# Patient Record
Sex: FEMALE | Race: WHITE | Employment: FULL TIME | URBAN - METROPOLITAN AREA
[De-identification: names, ages, dates, MRNs, and addresses within clinical notes are randomized per-mention and may not be internally consistent; named-entity substitution may affect disease eponyms.]

---

## 2017-03-06 LAB — HEPATITIS C ANTIBODY INTERPRETATION: NORMAL

## 2017-03-07 LAB
ABO/RH: NORMAL
ANTIBODY SCREEN: NORMAL
ANTIBODY SCREEN: NORMAL
BASOPHILS ABSOLUTE: 0 K/UL (ref 0–0.2)
BASOPHILS RELATIVE PERCENT: 0.7 %
EOSINOPHILS ABSOLUTE: 0.1 K/UL (ref 0–0.6)
EOSINOPHILS RELATIVE PERCENT: 2 %
HCT VFR BLD CALC: 42.1 % (ref 36–48)
HEMOGLOBIN: 14.1 G/DL (ref 12–16)
HEPATITIS B SURFACE ANTIGEN INTERPRETATION: NORMAL
LYMPHOCYTES ABSOLUTE: 1.3 K/UL (ref 1–5.1)
LYMPHOCYTES RELATIVE PERCENT: 18.2 %
MCH RBC QN AUTO: 32.3 PG (ref 26–34)
MCHC RBC AUTO-ENTMCNC: 33.4 G/DL (ref 31–36)
MCV RBC AUTO: 96.8 FL (ref 80–100)
MONOCYTES ABSOLUTE: 0.7 K/UL (ref 0–1.3)
MONOCYTES RELATIVE PERCENT: 10.4 %
NEUTROPHILS ABSOLUTE: 4.8 K/UL (ref 1.7–7.7)
NEUTROPHILS RELATIVE PERCENT: 68.7 %
PDW BLD-RTO: 13.1 % (ref 12.4–15.4)
PLATELET # BLD: 265 K/UL (ref 135–450)
PLATELET SLIDE REVIEW: ADEQUATE
PMV BLD AUTO: 7.3 FL (ref 5–10.5)
RBC # BLD: 4.35 M/UL (ref 4–5.2)
RUBELLA ANTIBODY IGG: 68.3 IU/ML
WBC # BLD: 6.9 K/UL (ref 4–11)

## 2017-07-18 LAB
GLUCOSE CHALLENGE: 121 MG/DL
HCT VFR BLD CALC: 33.7 % (ref 36–48)
HEMOGLOBIN: 11.3 G/DL (ref 12–16)
MCH RBC QN AUTO: 32.6 PG (ref 26–34)
MCHC RBC AUTO-ENTMCNC: 33.5 G/DL (ref 31–36)
MCV RBC AUTO: 97.2 FL (ref 80–100)
PDW BLD-RTO: 13.1 % (ref 12.4–15.4)
PLATELET # BLD: 247 K/UL (ref 135–450)
PMV BLD AUTO: 7.1 FL (ref 5–10.5)
RBC # BLD: 3.46 M/UL (ref 4–5.2)
WBC # BLD: 10.5 K/UL (ref 4–11)

## 2017-08-18 ENCOUNTER — OFFICE VISIT (OUTPATIENT)
Dept: ORTHOPEDIC SURGERY | Age: 27
End: 2017-08-18

## 2017-08-18 VITALS
HEART RATE: 89 BPM | SYSTOLIC BLOOD PRESSURE: 118 MMHG | DIASTOLIC BLOOD PRESSURE: 70 MMHG | BODY MASS INDEX: 41.66 KG/M2 | HEIGHT: 64 IN | WEIGHT: 244 LBS

## 2017-08-18 DIAGNOSIS — M51.36 DDD (DEGENERATIVE DISC DISEASE), LUMBAR: Primary | ICD-10-CM

## 2017-08-18 DIAGNOSIS — M62.830 BACK SPASM: ICD-10-CM

## 2017-08-18 PROCEDURE — 99203 OFFICE O/P NEW LOW 30 MIN: CPT | Performed by: ORTHOPAEDIC SURGERY

## 2017-08-18 RX ORDER — DEXTROAMPHETAMINE SULFATE 15 MG/1
CAPSULE, EXTENDED RELEASE ORAL
Status: ON HOLD | COMMUNITY
Start: 2017-08-05 | End: 2017-09-21

## 2017-08-18 RX ORDER — DEXTROAMPHETAMINE SULFATE 10 MG/1
TABLET ORAL
Status: ON HOLD | COMMUNITY
Start: 2017-08-05 | End: 2017-09-21

## 2017-08-18 RX ORDER — CYCLOBENZAPRINE HCL 5 MG
TABLET ORAL
Status: ON HOLD | COMMUNITY
Start: 2017-08-18 | End: 2017-09-21

## 2017-08-18 ASSESSMENT — ENCOUNTER SYMPTOMS: BACK PAIN: 1

## 2017-10-03 ENCOUNTER — HOSPITAL ENCOUNTER (OUTPATIENT)
Dept: PREADMISSION TESTING | Age: 27
Discharge: HOME | End: 2017-09-28

## 2017-10-18 ENCOUNTER — HOSPITAL ENCOUNTER (OUTPATIENT)
Dept: OTHER | Age: 27
Discharge: HOME | End: 2017-10-18
Attending: OBSTETRICS & GYNECOLOGY | Admitting: OBSTETRICS & GYNECOLOGY

## 2017-10-18 DIAGNOSIS — O13.9 PIH (PREGNANCY INDUCED HYPERTENSION), ANTEPARTUM: ICD-10-CM

## 2017-10-18 LAB
24HR URINE VOLUME (ML): 2700 ML
CREATININE 24 HOUR URINE: 1.5 G/24HR (ref 0.6–1.5)
PROTEIN 24 HOUR URINE: 0.35 G/24HR

## 2018-04-20 ENCOUNTER — OFFICE VISIT (OUTPATIENT)
Dept: INTERNAL MEDICINE CLINIC | Age: 28
End: 2018-04-20

## 2018-04-20 VITALS
HEIGHT: 64 IN | SYSTOLIC BLOOD PRESSURE: 120 MMHG | OXYGEN SATURATION: 98 % | WEIGHT: 255.2 LBS | DIASTOLIC BLOOD PRESSURE: 72 MMHG | RESPIRATION RATE: 16 BRPM | HEART RATE: 78 BPM | BODY MASS INDEX: 43.57 KG/M2 | TEMPERATURE: 98.6 F

## 2018-04-20 DIAGNOSIS — L84 CALLUS OF FOOT: ICD-10-CM

## 2018-04-20 DIAGNOSIS — Z00.00 PHYSICAL EXAM: ICD-10-CM

## 2018-04-20 DIAGNOSIS — E66.01 MORBID OBESITY (HCC): Primary | ICD-10-CM

## 2018-04-20 LAB
ANION GAP SERPL CALCULATED.3IONS-SCNC: 15 MMOL/L (ref 3–16)
BUN BLDV-MCNC: 12 MG/DL (ref 7–20)
CALCIUM SERPL-MCNC: 9.1 MG/DL (ref 8.3–10.6)
CHLORIDE BLD-SCNC: 103 MMOL/L (ref 99–110)
CHOLESTEROL, TOTAL: 176 MG/DL (ref 0–199)
CO2: 24 MMOL/L (ref 21–32)
CREAT SERPL-MCNC: 0.6 MG/DL (ref 0.6–1.1)
GFR AFRICAN AMERICAN: >60
GFR NON-AFRICAN AMERICAN: >60
GLUCOSE BLD-MCNC: 79 MG/DL (ref 70–99)
HCT VFR BLD CALC: 42 % (ref 36–48)
HDLC SERPL-MCNC: 45 MG/DL (ref 40–60)
HEMOGLOBIN: 14.2 G/DL (ref 12–16)
LDL CHOLESTEROL CALCULATED: 114 MG/DL
MCH RBC QN AUTO: 30.8 PG (ref 26–34)
MCHC RBC AUTO-ENTMCNC: 33.9 G/DL (ref 31–36)
MCV RBC AUTO: 90.8 FL (ref 80–100)
PDW BLD-RTO: 14.2 % (ref 12.4–15.4)
PLATELET # BLD: 301 K/UL (ref 135–450)
PMV BLD AUTO: 6.9 FL (ref 5–10.5)
POTASSIUM SERPL-SCNC: 4.5 MMOL/L (ref 3.5–5.1)
RBC # BLD: 4.63 M/UL (ref 4–5.2)
SODIUM BLD-SCNC: 142 MMOL/L (ref 136–145)
TRIGL SERPL-MCNC: 84 MG/DL (ref 0–150)
TSH REFLEX FT4: 0.8 UIU/ML (ref 0.27–4.2)
VLDLC SERPL CALC-MCNC: 17 MG/DL
WBC # BLD: 7 K/UL (ref 4–11)

## 2018-04-20 PROCEDURE — 99204 OFFICE O/P NEW MOD 45 MIN: CPT | Performed by: NURSE PRACTITIONER

## 2018-04-20 PROCEDURE — 36415 COLL VENOUS BLD VENIPUNCTURE: CPT | Performed by: NURSE PRACTITIONER

## 2018-04-20 ASSESSMENT — ENCOUNTER SYMPTOMS
DIARRHEA: 0
WHEEZING: 0
EYE PAIN: 0
SHORTNESS OF BREATH: 0
TROUBLE SWALLOWING: 0
CONSTIPATION: 0
COUGH: 0
ABDOMINAL PAIN: 0
BACK PAIN: 0
PHOTOPHOBIA: 0
NAUSEA: 0
VOMITING: 0
CHEST TIGHTNESS: 0
SORE THROAT: 0

## 2018-04-20 ASSESSMENT — PATIENT HEALTH QUESTIONNAIRE - PHQ9
1. LITTLE INTEREST OR PLEASURE IN DOING THINGS: 0
SUM OF ALL RESPONSES TO PHQ QUESTIONS 1-9: 0
2. FEELING DOWN, DEPRESSED OR HOPELESS: 0
SUM OF ALL RESPONSES TO PHQ9 QUESTIONS 1 & 2: 0

## 2018-04-21 LAB
ESTIMATED AVERAGE GLUCOSE: 99.7 MG/DL
HBA1C MFR BLD: 5.1 %

## 2018-04-23 LAB
HIV AG/AB: NORMAL
HIV ANTIGEN: NORMAL
HIV-1 ANTIBODY: NORMAL
HIV-2 AB: NORMAL

## 2018-11-11 LAB
CANDIDA SPECIES, DNA PROBE: ABNORMAL
GARDNERELLA VAGINALIS, DNA PROBE: ABNORMAL
TRICHOMONAS VAGINALIS DNA: ABNORMAL

## 2019-04-11 ENCOUNTER — TELEPHONE (OUTPATIENT)
Dept: INTERNAL MEDICINE CLINIC | Age: 29
End: 2019-04-11

## 2019-04-11 ENCOUNTER — OFFICE VISIT (OUTPATIENT)
Dept: INTERNAL MEDICINE CLINIC | Age: 29
End: 2019-04-11
Payer: COMMERCIAL

## 2019-04-11 VITALS
OXYGEN SATURATION: 98 % | HEART RATE: 72 BPM | HEIGHT: 64 IN | RESPIRATION RATE: 12 BRPM | BODY MASS INDEX: 41.66 KG/M2 | WEIGHT: 244 LBS | TEMPERATURE: 98.4 F

## 2019-04-11 DIAGNOSIS — R19.7 DIARRHEA, UNSPECIFIED TYPE: Primary | ICD-10-CM

## 2019-04-11 PROCEDURE — G8427 DOCREV CUR MEDS BY ELIG CLIN: HCPCS | Performed by: NURSE PRACTITIONER

## 2019-04-11 PROCEDURE — 99213 OFFICE O/P EST LOW 20 MIN: CPT | Performed by: NURSE PRACTITIONER

## 2019-04-11 PROCEDURE — 1036F TOBACCO NON-USER: CPT | Performed by: NURSE PRACTITIONER

## 2019-04-11 PROCEDURE — G8417 CALC BMI ABV UP PARAM F/U: HCPCS | Performed by: NURSE PRACTITIONER

## 2019-04-11 RX ORDER — DEXTROAMPHETAMINE SULFATE 15 MG/1
1 CAPSULE, EXTENDED RELEASE ORAL
Refills: 0 | COMMUNITY
Start: 2019-03-15 | End: 2021-12-31

## 2019-04-11 RX ORDER — DEXTROAMPHETAMINE SULFATE 10 MG/1
1 TABLET ORAL DAILY
Refills: 0 | Status: ON HOLD | COMMUNITY
Start: 2019-04-05 | End: 2021-12-31

## 2019-04-11 RX ORDER — GREEN TEA/HOODIA GORDONII 315-12.5MG
1 CAPSULE ORAL DAILY
Qty: 30 TABLET | Refills: 0 | Status: SHIPPED | OUTPATIENT
Start: 2019-04-11 | End: 2019-05-11

## 2019-04-11 ASSESSMENT — ENCOUNTER SYMPTOMS
FLATUS: 0
ABDOMINAL PAIN: 0
CHEST TIGHTNESS: 0
ANAL BLEEDING: 0
COUGH: 0
RECTAL PAIN: 0
NAUSEA: 0
VOMITING: 0
BLOATING: 1
ABDOMINAL DISTENTION: 0
CHOKING: 0
BLOOD IN STOOL: 0
DIARRHEA: 1
CONSTIPATION: 0

## 2019-04-11 ASSESSMENT — PATIENT HEALTH QUESTIONNAIRE - PHQ9: DEPRESSION UNABLE TO ASSESS: URGENT/EMERGENT SITUATION

## 2019-04-11 NOTE — PROGRESS NOTES
Attack Paternal Grandfather     Atrial Fibrillation Father     No Known Problems Sister     No Known Problems Brother     Rheum Arthritis Maternal Grandmother     No Known Problems Maternal Grandfather     Diabetes Paternal Grandmother        Social History     Socioeconomic History    Marital status: Single     Spouse name: Not on file    Number of children: Not on file    Years of education: Not on file    Highest education level: Not on file   Occupational History    Occupation: stay at home mom    Social Needs    Financial resource strain: Not on file    Food insecurity:     Worry: Not on file     Inability: Not on file    Transportation needs:     Medical: Not on file     Non-medical: Not on file   Tobacco Use    Smoking status: Former Smoker     Packs/day: 0.50     Years: 7.00     Pack years: 3.50     Types: Cigarettes     Last attempt to quit: 3/10/2017     Years since quittin.0    Smokeless tobacco: Never Used   Substance and Sexual Activity    Alcohol use: No    Drug use: No    Sexual activity: Yes     Partners: Male     Birth control/protection: Condom   Lifestyle    Physical activity:     Days per week: Not on file     Minutes per session: Not on file    Stress: Not on file   Relationships    Social connections:     Talks on phone: Not on file     Gets together: Not on file     Attends Cheondoism service: Not on file     Active member of club or organization: Not on file     Attends meetings of clubs or organizations: Not on file     Relationship status: Not on file    Intimate partner violence:     Fear of current or ex partner: Not on file     Emotionally abused: Not on file     Physically abused: Not on file     Forced sexual activity: Not on file   Other Topics Concern    Not on file   Social History Narrative    Not on file       Review of Systems   Constitutional: Negative for activity change, appetite change, chills, fatigue, fever, unexpected weight change and weight ACIDOPHILUS) TABS; Take 1 tablet by mouth daily  -     C DIFF TOXIN/ANTIGEN; Future  -     GI Bacterial Pathogens By PCR; Future    Patient instructed to increase PO intake as tolerated   All questions addressed and answered. Patient agrees with plan of care. No follow-ups on file.

## 2019-04-11 NOTE — LETTER
[facility] [address]  Phone: [phone]  Fax: [phone]    DENNYS Fonseca CNP        April 11, 2019     Patient: Duke Landaverde   YOB: 1990   Date of Visit: 4/11/2019       To Whom It May Concern: It is my medical opinion that Duke Landaverde should remain out of work pending lab results. If you have any questions or concerns, please don't hesitate to call.     Sincerely,        DENNYS Fonseca CNP

## 2019-04-11 NOTE — TELEPHONE ENCOUNTER
Pt said her employer needs a note saying that she cannot come back to work until the results of stool sample are done. She want to pick this up today.

## 2019-04-11 NOTE — TELEPHONE ENCOUNTER
Please provide note, instructed that patient should be excused from work pending negative lab result testing.  Thank you

## 2019-04-12 ENCOUNTER — TELEPHONE (OUTPATIENT)
Dept: INTERNAL MEDICINE CLINIC | Age: 29
End: 2019-04-12

## 2019-04-12 NOTE — TELEPHONE ENCOUNTER
VERÓNICA--Pt called. She said he was in yesterday for diarrhea. She took containers home for stool samples but she has not had any BM's at all since yesterday before appt. She will call back on Monday with an update.

## 2019-08-14 ENCOUNTER — OFFICE VISIT (OUTPATIENT)
Dept: INTERNAL MEDICINE CLINIC | Age: 29
End: 2019-08-14
Payer: COMMERCIAL

## 2019-08-14 VITALS
OXYGEN SATURATION: 98 % | DIASTOLIC BLOOD PRESSURE: 60 MMHG | WEIGHT: 240 LBS | BODY MASS INDEX: 40.97 KG/M2 | TEMPERATURE: 97.8 F | HEART RATE: 70 BPM | SYSTOLIC BLOOD PRESSURE: 120 MMHG | HEIGHT: 64 IN

## 2019-08-14 DIAGNOSIS — F17.219 CIGARETTE NICOTINE DEPENDENCE WITH NICOTINE-INDUCED DISORDER: ICD-10-CM

## 2019-08-14 DIAGNOSIS — F41.9 ANXIETY: ICD-10-CM

## 2019-08-14 DIAGNOSIS — R61 HYPERHIDROSIS: Primary | ICD-10-CM

## 2019-08-14 LAB
ANION GAP SERPL CALCULATED.3IONS-SCNC: 11 MMOL/L (ref 3–16)
BUN BLDV-MCNC: 16 MG/DL (ref 7–20)
CALCIUM SERPL-MCNC: 9.5 MG/DL (ref 8.3–10.6)
CHLORIDE BLD-SCNC: 104 MMOL/L (ref 99–110)
CO2: 25 MMOL/L (ref 21–32)
CREAT SERPL-MCNC: 0.7 MG/DL (ref 0.6–1.1)
GFR AFRICAN AMERICAN: >60
GFR NON-AFRICAN AMERICAN: >60
GLUCOSE BLD-MCNC: 88 MG/DL (ref 70–99)
GONADOTROPIN, CHORIONIC (HCG) QUANT: <5 MIU/ML
HCT VFR BLD CALC: 41.5 % (ref 36–48)
HEMOGLOBIN: 14 G/DL (ref 12–16)
MCH RBC QN AUTO: 31.9 PG (ref 26–34)
MCHC RBC AUTO-ENTMCNC: 33.6 G/DL (ref 31–36)
MCV RBC AUTO: 94.8 FL (ref 80–100)
PDW BLD-RTO: 13.5 % (ref 12.4–15.4)
PLATELET # BLD: 287 K/UL (ref 135–450)
PMV BLD AUTO: 7.1 FL (ref 5–10.5)
POTASSIUM SERPL-SCNC: 4.4 MMOL/L (ref 3.5–5.1)
RBC # BLD: 4.38 M/UL (ref 4–5.2)
SEDIMENTATION RATE, ERYTHROCYTE: 16 MM/HR (ref 0–20)
SODIUM BLD-SCNC: 140 MMOL/L (ref 136–145)
TSH REFLEX FT4: 1.37 UIU/ML (ref 0.27–4.2)
WBC # BLD: 6.3 K/UL (ref 4–11)

## 2019-08-14 PROCEDURE — G8427 DOCREV CUR MEDS BY ELIG CLIN: HCPCS | Performed by: NURSE PRACTITIONER

## 2019-08-14 PROCEDURE — 1036F TOBACCO NON-USER: CPT | Performed by: NURSE PRACTITIONER

## 2019-08-14 PROCEDURE — 36415 COLL VENOUS BLD VENIPUNCTURE: CPT | Performed by: NURSE PRACTITIONER

## 2019-08-14 PROCEDURE — 99213 OFFICE O/P EST LOW 20 MIN: CPT | Performed by: NURSE PRACTITIONER

## 2019-08-14 PROCEDURE — G8417 CALC BMI ABV UP PARAM F/U: HCPCS | Performed by: NURSE PRACTITIONER

## 2019-08-14 PROCEDURE — 99406 BEHAV CHNG SMOKING 3-10 MIN: CPT | Performed by: NURSE PRACTITIONER

## 2019-08-14 ASSESSMENT — ENCOUNTER SYMPTOMS
COLOR CHANGE: 0
WHEEZING: 0
STRIDOR: 0
ABDOMINAL DISTENTION: 0
COUGH: 0
CHEST TIGHTNESS: 0
CHOKING: 0
SHORTNESS OF BREATH: 0

## 2019-08-14 ASSESSMENT — PATIENT HEALTH QUESTIONNAIRE - PHQ9
SUM OF ALL RESPONSES TO PHQ QUESTIONS 1-9: 2
1. LITTLE INTEREST OR PLEASURE IN DOING THINGS: 1
SUM OF ALL RESPONSES TO PHQ QUESTIONS 1-9: 2
2. FEELING DOWN, DEPRESSED OR HOPELESS: 1
DEPRESSION UNABLE TO ASSESS: URGENT/EMERGENT SITUATION
SUM OF ALL RESPONSES TO PHQ9 QUESTIONS 1 & 2: 2
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
## 2019-08-15 DIAGNOSIS — F41.9 ANXIETY: Primary | ICD-10-CM

## 2019-08-15 LAB
ANTI-NUCLEAR ANTIBODY (ANA): NEGATIVE
ESTIMATED AVERAGE GLUCOSE: 96.8 MG/DL
HBA1C MFR BLD: 5 %

## 2019-08-15 RX ORDER — SERTRALINE HYDROCHLORIDE 25 MG/1
25 TABLET, FILM COATED ORAL DAILY
Qty: 30 TABLET | Refills: 3 | Status: ON HOLD | OUTPATIENT
Start: 2019-08-15 | End: 2022-01-01

## 2019-12-12 LAB
CANDIDA SPECIES, DNA PROBE: NORMAL
GARDNERELLA VAGINALIS, DNA PROBE: NORMAL
TRICHOMONAS VAGINALIS DNA: NORMAL

## 2021-12-31 ENCOUNTER — APPOINTMENT (OUTPATIENT)
Dept: CT IMAGING | Age: 31
DRG: 137 | End: 2021-12-31
Payer: COMMERCIAL

## 2021-12-31 ENCOUNTER — HOSPITAL ENCOUNTER (INPATIENT)
Age: 31
LOS: 3 days | Discharge: HOME | DRG: 137 | End: 2022-01-03
Attending: EMERGENCY MEDICINE | Admitting: INTERNAL MEDICINE
Payer: COMMERCIAL

## 2021-12-31 DIAGNOSIS — J96.01 ACUTE RESPIRATORY FAILURE WITH HYPOXIA (HCC): Primary | ICD-10-CM

## 2021-12-31 DIAGNOSIS — R11.2 NON-INTRACTABLE VOMITING WITH NAUSEA, UNSPECIFIED VOMITING TYPE: ICD-10-CM

## 2021-12-31 DIAGNOSIS — U07.1 PNEUMONIA DUE TO COVID-19 VIRUS: ICD-10-CM

## 2021-12-31 DIAGNOSIS — R11.2 NAUSEA VOMITING AND DIARRHEA: ICD-10-CM

## 2021-12-31 DIAGNOSIS — J12.82 PNEUMONIA DUE TO COVID-19 VIRUS: ICD-10-CM

## 2021-12-31 DIAGNOSIS — E86.0 DEHYDRATION: ICD-10-CM

## 2021-12-31 DIAGNOSIS — E04.9 THYROID ENLARGEMENT: ICD-10-CM

## 2021-12-31 DIAGNOSIS — R19.7 NAUSEA VOMITING AND DIARRHEA: ICD-10-CM

## 2021-12-31 DIAGNOSIS — E87.6 HYPOKALEMIA: ICD-10-CM

## 2021-12-31 LAB
A/G RATIO: 1 (ref 1.1–2.2)
ALBUMIN SERPL-MCNC: 3.7 G/DL (ref 3.4–5)
ALP BLD-CCNC: 49 U/L (ref 40–129)
ALT SERPL-CCNC: 20 U/L (ref 10–40)
ANION GAP SERPL CALCULATED.3IONS-SCNC: 15 MMOL/L (ref 3–16)
AST SERPL-CCNC: 44 U/L (ref 15–37)
BACTERIA: ABNORMAL /HPF
BASOPHILS ABSOLUTE: 0 K/UL (ref 0–0.2)
BASOPHILS RELATIVE PERCENT: 0.2 %
BILIRUB SERPL-MCNC: 0.4 MG/DL (ref 0–1)
BILIRUBIN URINE: ABNORMAL
BLOOD, URINE: NEGATIVE
BUN BLDV-MCNC: 8 MG/DL (ref 7–20)
C DIFF TOXIN/ANTIGEN: NORMAL
C-REACTIVE PROTEIN: 174.1 MG/L (ref 0–5.1)
CALCIUM SERPL-MCNC: 8.7 MG/DL (ref 8.3–10.6)
CHLORIDE BLD-SCNC: 100 MMOL/L (ref 99–110)
CLARITY: ABNORMAL
CO2: 22 MMOL/L (ref 21–32)
COLOR: ABNORMAL
COMMENT UA: ABNORMAL
CREAT SERPL-MCNC: 0.7 MG/DL (ref 0.6–1.1)
D DIMER: 887 NG/ML DDU (ref 0–229)
EOSINOPHILS ABSOLUTE: 0 K/UL (ref 0–0.6)
EOSINOPHILS RELATIVE PERCENT: 0 %
EPITHELIAL CELLS, UA: 3 /HPF (ref 0–5)
GFR AFRICAN AMERICAN: >60
GFR NON-AFRICAN AMERICAN: >60
GLUCOSE BLD-MCNC: 95 MG/DL (ref 70–99)
GLUCOSE URINE: NEGATIVE MG/DL
HCG QUALITATIVE: NEGATIVE
HCT VFR BLD CALC: 40.3 % (ref 36–48)
HEMOGLOBIN: 13.8 G/DL (ref 12–16)
HYALINE CASTS: 19 /LPF (ref 0–8)
KETONES, URINE: >=80 MG/DL
LEUKOCYTE ESTERASE, URINE: NEGATIVE
LIPASE: 24 U/L (ref 13–60)
LYMPHOCYTES ABSOLUTE: 1 K/UL (ref 1–5.1)
LYMPHOCYTES RELATIVE PERCENT: 20.4 %
MAGNESIUM: 2.2 MG/DL (ref 1.8–2.4)
MCH RBC QN AUTO: 31.4 PG (ref 26–34)
MCHC RBC AUTO-ENTMCNC: 34.1 G/DL (ref 31–36)
MCV RBC AUTO: 92 FL (ref 80–100)
MICROSCOPIC EXAMINATION: YES
MONOCYTES ABSOLUTE: 0.4 K/UL (ref 0–1.3)
MONOCYTES RELATIVE PERCENT: 7.8 %
NEUTROPHILS ABSOLUTE: 3.7 K/UL (ref 1.7–7.7)
NEUTROPHILS RELATIVE PERCENT: 71.6 %
NITRITE, URINE: NEGATIVE
PDW BLD-RTO: 13.6 % (ref 12.4–15.4)
PH UA: 6 (ref 5–8)
PLATELET # BLD: 167 K/UL (ref 135–450)
PMV BLD AUTO: 6.8 FL (ref 5–10.5)
POTASSIUM SERPL-SCNC: 3.4 MMOL/L (ref 3.5–5.1)
PROTEIN UA: >=300 MG/DL
RBC # BLD: 4.38 M/UL (ref 4–5.2)
RBC UA: 4 /HPF (ref 0–4)
SODIUM BLD-SCNC: 137 MMOL/L (ref 136–145)
SPECIFIC GRAVITY UA: 1.03 (ref 1–1.03)
TOTAL PROTEIN: 7.3 G/DL (ref 6.4–8.2)
TROPONIN: <0.01 NG/ML
URINE REFLEX TO CULTURE: ABNORMAL
URINE TYPE: ABNORMAL
UROBILINOGEN, URINE: 0.2 E.U./DL
WBC # BLD: 5.1 K/UL (ref 4–11)
WBC UA: 7 /HPF (ref 0–5)

## 2021-12-31 PROCEDURE — 81001 URINALYSIS AUTO W/SCOPE: CPT

## 2021-12-31 PROCEDURE — 87324 CLOSTRIDIUM AG IA: CPT

## 2021-12-31 PROCEDURE — 85379 FIBRIN DEGRADATION QUANT: CPT

## 2021-12-31 PROCEDURE — 87449 NOS EACH ORGANISM AG IA: CPT

## 2021-12-31 PROCEDURE — 83690 ASSAY OF LIPASE: CPT

## 2021-12-31 PROCEDURE — 93005 ELECTROCARDIOGRAM TRACING: CPT | Performed by: PHYSICIAN ASSISTANT

## 2021-12-31 PROCEDURE — 96374 THER/PROPH/DIAG INJ IV PUSH: CPT

## 2021-12-31 PROCEDURE — 84484 ASSAY OF TROPONIN QUANT: CPT

## 2021-12-31 PROCEDURE — 6370000000 HC RX 637 (ALT 250 FOR IP): Performed by: PHYSICIAN ASSISTANT

## 2021-12-31 PROCEDURE — 94640 AIRWAY INHALATION TREATMENT: CPT

## 2021-12-31 PROCEDURE — 36415 COLL VENOUS BLD VENIPUNCTURE: CPT

## 2021-12-31 PROCEDURE — 85025 COMPLETE CBC W/AUTO DIFF WBC: CPT

## 2021-12-31 PROCEDURE — 6360000004 HC RX CONTRAST MEDICATION: Performed by: EMERGENCY MEDICINE

## 2021-12-31 PROCEDURE — 96375 TX/PRO/DX INJ NEW DRUG ADDON: CPT

## 2021-12-31 PROCEDURE — 87505 NFCT AGENT DETECTION GI: CPT

## 2021-12-31 PROCEDURE — 94761 N-INVAS EAR/PLS OXIMETRY MLT: CPT

## 2021-12-31 PROCEDURE — 6360000002 HC RX W HCPCS: Performed by: PHYSICIAN ASSISTANT

## 2021-12-31 PROCEDURE — 84703 CHORIONIC GONADOTROPIN ASSAY: CPT

## 2021-12-31 PROCEDURE — 83735 ASSAY OF MAGNESIUM: CPT

## 2021-12-31 PROCEDURE — 99284 EMERGENCY DEPT VISIT MOD MDM: CPT

## 2021-12-31 PROCEDURE — 2700000000 HC OXYGEN THERAPY PER DAY

## 2021-12-31 PROCEDURE — 87493 C DIFF AMPLIFIED PROBE: CPT

## 2021-12-31 PROCEDURE — 6370000000 HC RX 637 (ALT 250 FOR IP): Performed by: EMERGENCY MEDICINE

## 2021-12-31 PROCEDURE — 86140 C-REACTIVE PROTEIN: CPT

## 2021-12-31 PROCEDURE — 6360000002 HC RX W HCPCS: Performed by: INTERNAL MEDICINE

## 2021-12-31 PROCEDURE — 6370000000 HC RX 637 (ALT 250 FOR IP): Performed by: INTERNAL MEDICINE

## 2021-12-31 PROCEDURE — 71260 CT THORAX DX C+: CPT

## 2021-12-31 PROCEDURE — 1200000000 HC SEMI PRIVATE

## 2021-12-31 PROCEDURE — 80053 COMPREHEN METABOLIC PANEL: CPT

## 2021-12-31 PROCEDURE — 2580000003 HC RX 258: Performed by: PHYSICIAN ASSISTANT

## 2021-12-31 PROCEDURE — 2580000003 HC RX 258: Performed by: INTERNAL MEDICINE

## 2021-12-31 RX ORDER — DEXTROAMPHETAMINE SULFATE 10 MG/1
10 TABLET ORAL DAILY
Status: CANCELLED | OUTPATIENT
Start: 2021-12-31

## 2021-12-31 RX ORDER — ONDANSETRON 2 MG/ML
4 INJECTION INTRAMUSCULAR; INTRAVENOUS ONCE
Status: COMPLETED | OUTPATIENT
Start: 2021-12-31 | End: 2021-12-31

## 2021-12-31 RX ORDER — ONDANSETRON 4 MG/1
4 TABLET, ORALLY DISINTEGRATING ORAL EVERY 8 HOURS PRN
Status: DISCONTINUED | OUTPATIENT
Start: 2021-12-31 | End: 2022-01-03 | Stop reason: HOSPADM

## 2021-12-31 RX ORDER — KETOROLAC TROMETHAMINE 30 MG/ML
15 INJECTION, SOLUTION INTRAMUSCULAR; INTRAVENOUS ONCE
Status: COMPLETED | OUTPATIENT
Start: 2021-12-31 | End: 2021-12-31

## 2021-12-31 RX ORDER — IPRATROPIUM BROMIDE AND ALBUTEROL SULFATE 2.5; .5 MG/3ML; MG/3ML
1 SOLUTION RESPIRATORY (INHALATION) ONCE
Status: COMPLETED | OUTPATIENT
Start: 2021-12-31 | End: 2021-12-31

## 2021-12-31 RX ORDER — POLYETHYLENE GLYCOL 3350 17 G/17G
17 POWDER, FOR SOLUTION ORAL DAILY PRN
Status: DISCONTINUED | OUTPATIENT
Start: 2021-12-31 | End: 2022-01-03 | Stop reason: HOSPADM

## 2021-12-31 RX ORDER — DEXAMETHASONE SODIUM PHOSPHATE 10 MG/ML
6 INJECTION, SOLUTION INTRAMUSCULAR; INTRAVENOUS ONCE
Status: COMPLETED | OUTPATIENT
Start: 2021-12-31 | End: 2021-12-31

## 2021-12-31 RX ORDER — 0.9 % SODIUM CHLORIDE 0.9 %
1000 INTRAVENOUS SOLUTION INTRAVENOUS ONCE
Status: COMPLETED | OUTPATIENT
Start: 2021-12-31 | End: 2021-12-31

## 2021-12-31 RX ORDER — ACETAMINOPHEN 325 MG/1
650 TABLET ORAL EVERY 6 HOURS PRN
Status: DISCONTINUED | OUTPATIENT
Start: 2021-12-31 | End: 2022-01-03 | Stop reason: HOSPADM

## 2021-12-31 RX ORDER — ONDANSETRON 2 MG/ML
4 INJECTION INTRAMUSCULAR; INTRAVENOUS EVERY 6 HOURS PRN
Status: DISCONTINUED | OUTPATIENT
Start: 2021-12-31 | End: 2022-01-03 | Stop reason: HOSPADM

## 2021-12-31 RX ORDER — SODIUM CHLORIDE 0.9 % (FLUSH) 0.9 %
5-40 SYRINGE (ML) INJECTION EVERY 12 HOURS SCHEDULED
Status: DISCONTINUED | OUTPATIENT
Start: 2021-12-31 | End: 2022-01-03 | Stop reason: HOSPADM

## 2021-12-31 RX ORDER — GUAIFENESIN/DEXTROMETHORPHAN 100-10MG/5
5 SYRUP ORAL EVERY 4 HOURS PRN
Status: DISCONTINUED | OUTPATIENT
Start: 2021-12-31 | End: 2022-01-03 | Stop reason: HOSPADM

## 2021-12-31 RX ORDER — POTASSIUM CHLORIDE 20 MEQ/1
40 TABLET, EXTENDED RELEASE ORAL ONCE
Status: COMPLETED | OUTPATIENT
Start: 2021-12-31 | End: 2021-12-31

## 2021-12-31 RX ORDER — SODIUM CHLORIDE 0.9 % (FLUSH) 0.9 %
5-40 SYRINGE (ML) INJECTION PRN
Status: DISCONTINUED | OUTPATIENT
Start: 2021-12-31 | End: 2022-01-03 | Stop reason: HOSPADM

## 2021-12-31 RX ORDER — SERTRALINE HYDROCHLORIDE 25 MG/1
25 TABLET, FILM COATED ORAL DAILY
Status: DISCONTINUED | OUTPATIENT
Start: 2022-01-01 | End: 2022-01-01

## 2021-12-31 RX ORDER — ACETAMINOPHEN 650 MG/1
650 SUPPOSITORY RECTAL EVERY 6 HOURS PRN
Status: DISCONTINUED | OUTPATIENT
Start: 2021-12-31 | End: 2022-01-03 | Stop reason: HOSPADM

## 2021-12-31 RX ORDER — ZINC SULFATE 50(220)MG
50 CAPSULE ORAL DAILY
Status: DISCONTINUED | OUTPATIENT
Start: 2022-01-01 | End: 2022-01-03 | Stop reason: HOSPADM

## 2021-12-31 RX ORDER — VITAMIN B COMPLEX
2000 TABLET ORAL DAILY
Status: DISCONTINUED | OUTPATIENT
Start: 2022-01-01 | End: 2022-01-03 | Stop reason: HOSPADM

## 2021-12-31 RX ORDER — SODIUM CHLORIDE 9 MG/ML
25 INJECTION, SOLUTION INTRAVENOUS PRN
Status: DISCONTINUED | OUTPATIENT
Start: 2021-12-31 | End: 2022-01-03 | Stop reason: HOSPADM

## 2021-12-31 RX ORDER — DEXAMETHASONE SODIUM PHOSPHATE 10 MG/ML
6 INJECTION, SOLUTION INTRAMUSCULAR; INTRAVENOUS DAILY
Status: DISCONTINUED | OUTPATIENT
Start: 2022-01-01 | End: 2022-01-03 | Stop reason: HOSPADM

## 2021-12-31 RX ADMIN — DEXAMETHASONE SODIUM PHOSPHATE 6 MG: 10 INJECTION, SOLUTION INTRAMUSCULAR; INTRAVENOUS at 19:47

## 2021-12-31 RX ADMIN — ONDANSETRON 4 MG: 2 INJECTION INTRAMUSCULAR; INTRAVENOUS at 22:17

## 2021-12-31 RX ADMIN — POTASSIUM CHLORIDE 40 MEQ: 1500 TABLET, EXTENDED RELEASE ORAL at 18:48

## 2021-12-31 RX ADMIN — IPRATROPIUM BROMIDE AND ALBUTEROL SULFATE 1 AMPULE: .5; 3 SOLUTION RESPIRATORY (INHALATION) at 18:13

## 2021-12-31 RX ADMIN — SODIUM CHLORIDE 1000 ML: 9 INJECTION, SOLUTION INTRAVENOUS at 18:51

## 2021-12-31 RX ADMIN — IOPAMIDOL 75 ML: 755 INJECTION, SOLUTION INTRAVENOUS at 19:01

## 2021-12-31 RX ADMIN — KETOROLAC TROMETHAMINE 15 MG: 30 INJECTION, SOLUTION INTRAMUSCULAR at 18:47

## 2021-12-31 RX ADMIN — GUAIFENESIN AND DEXTROMETHORPHAN 5 ML: 100; 10 SYRUP ORAL at 22:17

## 2021-12-31 RX ADMIN — Medication 10 ML: at 22:18

## 2021-12-31 RX ADMIN — ENOXAPARIN SODIUM 30 MG: 30 INJECTION SUBCUTANEOUS at 22:17

## 2021-12-31 RX ADMIN — ONDANSETRON 4 MG: 2 INJECTION INTRAMUSCULAR; INTRAVENOUS at 18:47

## 2021-12-31 RX ADMIN — ACETAMINOPHEN 650 MG: 325 TABLET ORAL at 22:17

## 2021-12-31 ASSESSMENT — PAIN DESCRIPTION - DESCRIPTORS: DESCRIPTORS: ACHING

## 2021-12-31 ASSESSMENT — PAIN DESCRIPTION - PAIN TYPE: TYPE: ACUTE PAIN

## 2021-12-31 ASSESSMENT — PAIN DESCRIPTION - ORIENTATION: ORIENTATION: ANTERIOR

## 2021-12-31 ASSESSMENT — PAIN DESCRIPTION - FREQUENCY: FREQUENCY: INTERMITTENT

## 2021-12-31 ASSESSMENT — PAIN SCALES - GENERAL
PAINLEVEL_OUTOF10: 5
PAINLEVEL_OUTOF10: 3
PAINLEVEL_OUTOF10: 3

## 2021-12-31 ASSESSMENT — PAIN DESCRIPTION - LOCATION: LOCATION: CHEST

## 2021-12-31 ASSESSMENT — PAIN DESCRIPTION - ONSET: ONSET: ON-GOING

## 2021-12-31 NOTE — ED PROVIDER NOTES
EKG is reviewed myself. Dated today 56.   Rate 103 sinus tach otherwise normal.     Eliseo Haq MD  12/31/21 [id number]
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
## 2022-01-01 LAB
A/G RATIO: 0.9 (ref 1.1–2.2)
ALBUMIN SERPL-MCNC: 3.7 G/DL (ref 3.4–5)
ALP BLD-CCNC: 52 U/L (ref 40–129)
ALT SERPL-CCNC: 26 U/L (ref 10–40)
ANION GAP SERPL CALCULATED.3IONS-SCNC: 12 MMOL/L (ref 3–16)
AST SERPL-CCNC: 48 U/L (ref 15–37)
BASOPHILS ABSOLUTE: 0 K/UL (ref 0–0.2)
BASOPHILS RELATIVE PERCENT: 0.1 %
BILIRUB SERPL-MCNC: 0.4 MG/DL (ref 0–1)
BUN BLDV-MCNC: 10 MG/DL (ref 7–20)
C-REACTIVE PROTEIN: 174.9 MG/L (ref 0–5.1)
C. DIFFICILE TOXIN MOLECULAR: NORMAL
CALCIUM SERPL-MCNC: 8.7 MG/DL (ref 8.3–10.6)
CHLORIDE BLD-SCNC: 105 MMOL/L (ref 99–110)
CO2: 22 MMOL/L (ref 21–32)
CREAT SERPL-MCNC: 0.6 MG/DL (ref 0.6–1.1)
D DIMER: 925 NG/ML DDU (ref 0–229)
EKG ATRIAL RATE: 103 BPM
EKG DIAGNOSIS: NORMAL
EKG P AXIS: 34 DEGREES
EKG P-R INTERVAL: 118 MS
EKG Q-T INTERVAL: 334 MS
EKG QRS DURATION: 84 MS
EKG QTC CALCULATION (BAZETT): 437 MS
EKG R AXIS: 52 DEGREES
EKG T AXIS: 3 DEGREES
EKG VENTRICULAR RATE: 103 BPM
EOSINOPHILS ABSOLUTE: 0 K/UL (ref 0–0.6)
EOSINOPHILS RELATIVE PERCENT: 0 %
GFR AFRICAN AMERICAN: >60
GFR NON-AFRICAN AMERICAN: >60
GI BACTERIAL PATHOGENS BY PCR: NORMAL
GLUCOSE BLD-MCNC: 134 MG/DL (ref 70–99)
HCT VFR BLD CALC: 41.9 % (ref 36–48)
HEMOGLOBIN: 14 G/DL (ref 12–16)
LYMPHOCYTES ABSOLUTE: 0.5 K/UL (ref 1–5.1)
LYMPHOCYTES RELATIVE PERCENT: 12.4 %
MCH RBC QN AUTO: 30.9 PG (ref 26–34)
MCHC RBC AUTO-ENTMCNC: 33.4 G/DL (ref 31–36)
MCV RBC AUTO: 92.4 FL (ref 80–100)
MONOCYTES ABSOLUTE: 0.3 K/UL (ref 0–1.3)
MONOCYTES RELATIVE PERCENT: 6.9 %
NEUTROPHILS ABSOLUTE: 3.4 K/UL (ref 1.7–7.7)
NEUTROPHILS RELATIVE PERCENT: 80.6 %
PDW BLD-RTO: 13.4 % (ref 12.4–15.4)
PLATELET # BLD: 189 K/UL (ref 135–450)
PMV BLD AUTO: 7.2 FL (ref 5–10.5)
POTASSIUM REFLEX MAGNESIUM: 4.4 MMOL/L (ref 3.5–5.1)
RBC # BLD: 4.53 M/UL (ref 4–5.2)
SODIUM BLD-SCNC: 139 MMOL/L (ref 136–145)
TOTAL PROTEIN: 7.6 G/DL (ref 6.4–8.2)
WBC # BLD: 4.3 K/UL (ref 4–11)

## 2022-01-01 PROCEDURE — 85379 FIBRIN DEGRADATION QUANT: CPT

## 2022-01-01 PROCEDURE — 6360000002 HC RX W HCPCS: Performed by: INTERNAL MEDICINE

## 2022-01-01 PROCEDURE — 2580000003 HC RX 258: Performed by: INTERNAL MEDICINE

## 2022-01-01 PROCEDURE — 36415 COLL VENOUS BLD VENIPUNCTURE: CPT

## 2022-01-01 PROCEDURE — 6360000002 HC RX W HCPCS: Performed by: NURSE PRACTITIONER

## 2022-01-01 PROCEDURE — XW033H5 INTRODUCTION OF TOCILIZUMAB INTO PERIPHERAL VEIN, PERCUTANEOUS APPROACH, NEW TECHNOLOGY GROUP 5: ICD-10-PCS | Performed by: INTERNAL MEDICINE

## 2022-01-01 PROCEDURE — 2580000003 HC RX 258: Performed by: NURSE PRACTITIONER

## 2022-01-01 PROCEDURE — 6360000002 HC RX W HCPCS: Performed by: PHYSICIAN ASSISTANT

## 2022-01-01 PROCEDURE — 2700000000 HC OXYGEN THERAPY PER DAY

## 2022-01-01 PROCEDURE — 80053 COMPREHEN METABOLIC PANEL: CPT

## 2022-01-01 PROCEDURE — 86140 C-REACTIVE PROTEIN: CPT

## 2022-01-01 PROCEDURE — 85025 COMPLETE CBC W/AUTO DIFF WBC: CPT

## 2022-01-01 PROCEDURE — 6370000000 HC RX 637 (ALT 250 FOR IP): Performed by: INTERNAL MEDICINE

## 2022-01-01 PROCEDURE — 1200000000 HC SEMI PRIVATE

## 2022-01-01 PROCEDURE — 93010 ELECTROCARDIOGRAM REPORT: CPT | Performed by: INTERNAL MEDICINE

## 2022-01-01 RX ORDER — KETOROLAC TROMETHAMINE 30 MG/ML
30 INJECTION, SOLUTION INTRAMUSCULAR; INTRAVENOUS EVERY 6 HOURS PRN
Status: DISCONTINUED | OUTPATIENT
Start: 2022-01-01 | End: 2022-01-03 | Stop reason: HOSPADM

## 2022-01-01 RX ADMIN — Medication 10 ML: at 19:40

## 2022-01-01 RX ADMIN — ONDANSETRON 4 MG: 2 INJECTION INTRAMUSCULAR; INTRAVENOUS at 16:21

## 2022-01-01 RX ADMIN — ACETAMINOPHEN 650 MG: 325 TABLET ORAL at 18:57

## 2022-01-01 RX ADMIN — DEXAMETHASONE SODIUM PHOSPHATE 6 MG: 10 INJECTION, SOLUTION INTRAMUSCULAR; INTRAVENOUS at 08:47

## 2022-01-01 RX ADMIN — KETOROLAC TROMETHAMINE 30 MG: 30 INJECTION, SOLUTION INTRAMUSCULAR; INTRAVENOUS at 21:50

## 2022-01-01 RX ADMIN — ENOXAPARIN SODIUM 30 MG: 30 INJECTION SUBCUTANEOUS at 19:46

## 2022-01-01 RX ADMIN — Medication 50 MG: at 08:47

## 2022-01-01 RX ADMIN — ONDANSETRON 4 MG: 2 INJECTION INTRAMUSCULAR; INTRAVENOUS at 08:47

## 2022-01-01 RX ADMIN — ACETAMINOPHEN 650 MG: 325 TABLET ORAL at 09:01

## 2022-01-01 RX ADMIN — Medication 10 ML: at 08:48

## 2022-01-01 RX ADMIN — Medication 2000 UNITS: at 08:47

## 2022-01-01 RX ADMIN — GUAIFENESIN AND DEXTROMETHORPHAN 5 ML: 100; 10 SYRUP ORAL at 19:46

## 2022-01-01 RX ADMIN — ENOXAPARIN SODIUM 30 MG: 30 INJECTION SUBCUTANEOUS at 08:47

## 2022-01-01 RX ADMIN — TOCILIZUMAB 810 MG: 180 INJECTION, SOLUTION SUBCUTANEOUS at 11:30

## 2022-01-01 ASSESSMENT — PAIN DESCRIPTION - DESCRIPTORS: DESCRIPTORS: ACHING

## 2022-01-01 ASSESSMENT — PAIN SCALES - GENERAL
PAINLEVEL_OUTOF10: 0
PAINLEVEL_OUTOF10: 8
PAINLEVEL_OUTOF10: 7
PAINLEVEL_OUTOF10: 0

## 2022-01-01 ASSESSMENT — PAIN DESCRIPTION - PAIN TYPE: TYPE: ACUTE PAIN

## 2022-01-01 ASSESSMENT — PAIN DESCRIPTION - FREQUENCY: FREQUENCY: INTERMITTENT

## 2022-01-01 ASSESSMENT — PAIN DESCRIPTION - LOCATION: LOCATION: HEAD;CHEST;NECK

## 2022-01-01 ASSESSMENT — PAIN DESCRIPTION - ONSET: ONSET: GRADUAL

## 2022-01-01 ASSESSMENT — PAIN DESCRIPTION - ORIENTATION: ORIENTATION: OTHER (COMMENT)

## 2022-01-01 NOTE — PROGRESS NOTES
Assessment complete. VSS. On 2L NC. Patient resting in bed. Respirations even and easy. Call light in reach. Fall precautions in place. No needs expressed at this time. Pt complain of nausea, PRN zofran given. Pt still having diarrhea. Temp 101.1, PRN tylenol given.

## 2022-01-01 NOTE — ED NOTES
Report given to 5T nurse. No further questions at this time.       Jennifer Garcia RN  12/31/21 5511

## 2022-01-01 NOTE — ED PROVIDER NOTES
I independently performed a history and physical on [facility]. All diagnostic, treatment, and disposition decisions were made by myself in conjunction with the advanced practice provider. Briefly, this is a 32 y.o. female here for shortness of breath. Associated with generalized weakness and body aches. Patient with known diagnosis of COVID-19 pneumonia. This is her third emergency department visit over the past 48 hours for her symptoms. She is unvaccinated. On exam, patient febrile and mildly ill-appearing, however nontoxic. No distress. Heart mildly tachycardic, regular rhythm. She is tachypneic, no retractions, speaks in full sentences. Lungs with bibasilar crackles, no wheezes. Abdomen soft, nondistended, nontender to palpation in all quadrants. EKG  EKG was reviewed by emergency department physician in the absence of a cardiologist    Narrow complex sinus rhythm, rate 103, normal axis, normal [place] and QRS intervals, normal Qtc, no ST elevations or depressions, normal t-wave morphology, impression sinus tachycardia, no STEMI      Screenings   [place] Coma Scale  Eye Opening: Spontaneous  Best Verbal Response: Oriented  Best Motor Response: Obeys commands  Tegan Coma Scale Score: 15        MDM    Patient febrile however nontoxic. Appears uncomfortable however in no chester distress. Pulse oxygenation 91 to 92% at rest.  EKG without evidence of acute ischemia. D-dimer elevated, follow-up CTA shows no evidence of pulmonary embolism, pneumothorax or mediastinal abnormality. There are multifocal infiltrates consistent with known COVID-19 pneumonia. Incidentally noted enlargement of left thyroid gland, I discussed this finding with patient including importance of follow-up which can be safely performed in an outpatient setting. Low suspicion for superimposed bacterial process.   I personally ambulated patient in the emergency room, she was walked once around the nursing station after which she became noticeably dyspneic, her pulse ox was rechecked and 85-86% on room air. Patient was placed on 2 L supplemental oxygen by nasal cannula and took several minutes to recover back to the low 90s. Not safe for discharge to self-care at this time. Will give Decadron for respiratory failure in the setting of COVID-19 pneumonia. Case discussed with internal medicine team who will admit for further evaluation and care. Critical care:    I have discussed the case with the advanced practice provider. I have personally performed a history, physical exam, and my own medical decision making. I have reviewed the note and agree with the findings and plan. Upon my evaluation, this patient had a high probability of imminent or life-threatening deterioration due to acute hypoxic respiratory failure which required my direct attention, intervention, and personal management. The total critical care time personally spent while evaluating and treating this patient was 32 minutes exclusive of any time spent doing separately billable procedures. This includes time at the bedside, data interpretation, medication management, monitoring for potential decompensation and physician consultation. Specifics of interventions taken and potentially life-threatening diagnostic considerations are listed above in the medical decision making. Patient Referrals:  No follow-up provider specified. Discharge Medications:  Current Discharge Medication List          FINAL IMPRESSION  1. Acute respiratory failure with hypoxia ([address]    2. Pneumonia due to COVID-19 virus    3. Hypokalemia    4. Dehydration    5. Non-intractable vomiting with nausea, unspecified vomiting type    6. Thyroid enlargement    7. Nausea vomiting and diarrhea        Blood pressure 116/82, pulse 68, temperature 98.6 °F (37 °C), temperature source Temporal, resp.  rate 18, height 5' 4\" (1.626 m), weight 252 lb 6.8 oz (114.5 kg), last menstrual period 12/28/2021, SpO2 94 %, not currently breastfeeding. For further details of [facility] emergency department encounter, please see documentation by advanced practice provider, ALE Owusu.     Jareth Loza DO (electronically signed)  Attending Emergency Physician       Jareth Loza DO  01/01/22 [id number] [facility] Joy Burkitt,   01/01/22 2034
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
## 2022-01-01 NOTE — ED NOTES
Dr. Anne Madison ambulated pt with pulse ox. Pt desatted to 86% while walking.  Pt placed on 2L NC.      Simona Bello RN  12/31/21 1941

## 2022-01-01 NOTE — PROGRESS NOTES
Comprehensive Nutrition Assessment    Type and Reason for Visit:  Positive Nutrition Screen (MST 2)    Nutrition Recommendations/Plan:   1. Please obtain actual weight; current weight is stated  2. Offer Ensure Clear BID    Nutrition Assessment:  Pt is nutritionally compromised upon admission as evidenced by pt report of inability to tolerate PO intake prior to admission d/t nausea, vomiting and diarrhea. Pt currently on a regular diet; no PO intake noted. Pt complaining of nausea this morning and receiving Zofran. Will offer Ensure Clear BID for additional nutrition as this is sometimes better tolerated than Ensure Enlive with nausea/vomiting. Malnutrition Assessment:  Malnutrition Status:  Insufficient data      Nutrition Related Findings:  No edema noted. LBM 1/1. Wounds:  None       Current Nutrition Therapies:    ADULT DIET; Regular    Anthropometric Measures:  · Height: 5' 4\" (162.6 cm)  · Current Body Weight: 250 lb (113.4 kg)   · Ideal Body Weight: 120 lbs; % Ideal Body Weight 208.3 %   · BMI: 42.9  · BMI Categories: Obese Class 3 (BMI 40.0 or greater)       Nutrition Diagnosis:   · Inadequate protein-energy intake related to altered GI function as evidenced by nausea,vomiting,diarrhea      Nutrition Interventions:   Food and/or Nutrient Delivery:  Continue Current Diet,Start Oral Nutrition Supplement  Nutrition Education/Counseling:  Education not indicated   Coordination of Nutrition Care:  Continue to monitor while inpatient    Goals:  Pt will consume at least 50% of meals and supplements       Nutrition Monitoring and Evaluation:   Behavioral-Environmental Outcomes:  None Identified   Food/Nutrient Intake Outcomes:  None Identified  Physical Signs/Symptoms Outcomes:  Diarrhea,Nausea or Vomiting,Weight     Discharge Planning:     Too soon to determine     Electronically signed by Bessy Rothman RD, SERGIO on 1/1/22 at 9:01 AM EST    Contact: [phone]

## 2022-01-01 NOTE — PROGRESS NOTES
[facility] PROGRESS NOTE    1/1/2022 10:22 AM        Name: Claudine Guajardo . Admitted: 12/31/2021  Primary Care Provider: No primary care provider on file. (Tel: None)      Chief Complaint   Patient presents with    Positive For Covid-19     pt states she has been covid + for 9 days. pt having n/v/d, cough, fever and o2 saturation of 92%. pt states she was seen at [facility] yeserday     Brief History: Patient is a 32 to female diagnosed with COVID 12/24. She has been seen in [facility] AT [facility] ER twice since her diagnosis but O2 sats above 90% and discharged home. She continues to note fever, cough, shortness of breath, n/v/d and presented to [facility]. CT pulmonary with diffuse bilateral ground glass opacities. In the ER, her O2 saturation had dropped to 86% on room air and she was started on Decadron. Subjective:  Resting in bed. Says she does not feel well, she is fatigued, continues to feel short of breath and experiencing loose stools. She is tolerating diet. O2 sats stable mid 90s on 2 liters, decreased to 1 liter.      Reviewed interval ancillary notes    Current Medications  sertraline (ZOLOFT) tablet 25 mg, Daily  sodium chloride flush 0.9 % injection 5-40 mL, 2 times per day  sodium chloride flush 0.9 % injection 5-40 mL, PRN  0.9 % sodium chloride infusion, PRN  enoxaparin (LOVENOX) injection 30 mg, BID  ondansetron (ZOFRAN-ODT) disintegrating tablet 4 mg, Q8H PRN   Or  ondansetron (ZOFRAN) injection 4 mg, Q6H PRN  polyethylene glycol (GLYCOLAX) packet 17 g, Daily PRN  acetaminophen (TYLENOL) tablet 650 mg, Q6H PRN   Or  acetaminophen (TYLENOL) suppository 650 mg, Q6H PRN  guaiFENesin-dextromethorphan (ROBITUSSIN DM) 100-10 MG/5ML syrup 5 mL, Q4H PRN  dexamethasone (PF) (DECADRON) injection 6 mg, Daily  Vitamin D (CHOLECALCIFEROL) tablet 2,000 Units, Daily  zinc sulfate (ZINCATE) capsule 50 mg, Daily        Objective:  BP 122/84   Pulse 86   Temp 101.1 °F (38.4 °C) (Temporal)   Resp 18   Ht 5' 4\" (1.626 m)   Wt 250 lb (113.4 kg)   LMP 12/28/2021   SpO2 94%   BMI 42.91 kg/m²   No intake or output data in the 24 hours ending 01/01/22 1022   Wt Readings from Last 3 Encounters:   12/31/21 250 lb (113.4 kg)   08/14/19 240 lb (108.9 kg)   04/11/19 244 lb (110.7 kg)       General appearance:  Appears comfortable  Eyes: Sclera clear. Pupils equal.  ENT: Moist oral mucosa. Trachea midline, no adenopathy. Cardiovascular: Regular rhythm, normal S1, S2. No murmur. No edema in lower extremities  Respiratory: Not using accessory muscles. Good inspiratory effort. Diminished in bases, no wheeze or crackles. GI: Abdomen soft, no tenderness, not distended, normal bowel sounds  Musculoskeletal: No cyanosis in digits, neck supple  Neurology: CN 2-12 grossly intact. No speech or motor deficits  Psych: Normal affect. Alert and oriented in time, place and person  Skin: Warm, dry, normal turgor    Labs and Tests:  CBC:   Recent Labs     12/31/21  1753 01/01/22  0636   WBC 5.1 4.3   HGB 13.8 14.0    189     BMP:    Recent Labs     12/31/21  1753 01/01/22  0636    139   K 3.4* 4.4    105   CO2 22 22   BUN 8 10   CREATININE 0.7 0.6   GLUCOSE 95 134*     Hepatic:   Recent Labs     12/31/21 1753 01/01/22  0636   AST 44* 48*   ALT 20 26   BILITOT 0.4 0.4   ALKPHOS 49 52       CT Pulmonary 12/31/2021:  1. No evidence of pulmonary embolism   2. Diffuse ground-glass opacities present bilaterally, consistent with   COVID-19 pulmonary disease given the clinical history   3. Enlarged main pulmonary artery measuring 34 mm, suggesting pulmonary   arterial hypertension   4. Enlarged, heterogeneous left lobe of the thyroid gland.  Elective thyroid   ultrasound recommended for further evaluation   5.  Nonspecific mediastinal and hilar lymph nodes, likely reactive         Problem List  Principal Problem:    Pneumonia due to COVID-19 virus  Active Problems:    Acute hypoxemic respiratory failure due to COVID-19 [facility])  Resolved Problems:    * No resolved hospital problems. *       Assessment & Plan:   1. COVID pneumonia. PCR positive 12/24. CT pulmonary negative for PE but with diffuse bilateral ground glass opacities. On supplemental O2, sats stable at 2 liters. Started on IV dexamethasone on admission. , reviewed with pharmacy, will give 1 dose Actemra. Since symptom onset more than 7 days ago, not a candidate for remdesivir. D-dimer 887->925, continue BID lovenox. Encouraged IS, proning and up in chair    2. Acute hypoxic respiratory failure. O2 sat in ER as low as 86% and started on supplemental O2, sats stable on 2 liters. Treatment as above. Wean O2 as tolerated to maintain sats > 90%. 3. Hypokalemia. Potassium 3.4 and replaced. Likely secondary GI losses and poor po intake. Continue to follow. Magnesium 2.20.  4. Diarrhea. GI pathogens and c diff negative. Most likely secondary to covid. 5. Enlargement of left lobe of thyroid. Incidental finding on CT scan. Recommend thyroid ultrasound as outpatient. 6. Ketonuria / proteinuria. UA with > 80 ketones. Most likely secondary to n/v/d from COVID infection. She was given 1 liter fluids in ER. Recommend recheck UA once recovers from [place]. Diet: ADULT DIET;  Regular  ADULT ORAL NUTRITION SUPPLEMENT; Lunch, Dinner; Clear Liquid Oral Supplement  Code:Full Code  DVT PPX: enoxaparin      [place] Orellana CNP   1/1/2022 10:22 AM

## 2022-01-02 LAB
ANION GAP SERPL CALCULATED.3IONS-SCNC: 13 MMOL/L (ref 3–16)
BUN BLDV-MCNC: 12 MG/DL (ref 7–20)
C-REACTIVE PROTEIN: 68.2 MG/L (ref 0–5.1)
CALCIUM SERPL-MCNC: 8.7 MG/DL (ref 8.3–10.6)
CHLORIDE BLD-SCNC: 105 MMOL/L (ref 99–110)
CO2: 19 MMOL/L (ref 21–32)
CREAT SERPL-MCNC: 0.6 MG/DL (ref 0.6–1.1)
D DIMER: 1108 NG/ML DDU (ref 0–229)
GFR AFRICAN AMERICAN: >60
GFR NON-AFRICAN AMERICAN: >60
GLUCOSE BLD-MCNC: 170 MG/DL (ref 70–99)
HCT VFR BLD CALC: 38.1 % (ref 36–48)
HEMOGLOBIN: 12.7 G/DL (ref 12–16)
MCH RBC QN AUTO: 30.8 PG (ref 26–34)
MCHC RBC AUTO-ENTMCNC: 33.4 G/DL (ref 31–36)
MCV RBC AUTO: 92.4 FL (ref 80–100)
PDW BLD-RTO: 13.8 % (ref 12.4–15.4)
PLATELET # BLD: 251 K/UL (ref 135–450)
PMV BLD AUTO: 7.4 FL (ref 5–10.5)
POTASSIUM SERPL-SCNC: 3.8 MMOL/L (ref 3.5–5.1)
RBC # BLD: 4.13 M/UL (ref 4–5.2)
SODIUM BLD-SCNC: 137 MMOL/L (ref 136–145)
WBC # BLD: 5.1 K/UL (ref 4–11)

## 2022-01-02 PROCEDURE — 2580000003 HC RX 258: Performed by: INTERNAL MEDICINE

## 2022-01-02 PROCEDURE — 36415 COLL VENOUS BLD VENIPUNCTURE: CPT

## 2022-01-02 PROCEDURE — 6360000002 HC RX W HCPCS: Performed by: PHYSICIAN ASSISTANT

## 2022-01-02 PROCEDURE — 86140 C-REACTIVE PROTEIN: CPT

## 2022-01-02 PROCEDURE — 80048 BASIC METABOLIC PNL TOTAL CA: CPT

## 2022-01-02 PROCEDURE — 2700000000 HC OXYGEN THERAPY PER DAY

## 2022-01-02 PROCEDURE — 85379 FIBRIN DEGRADATION QUANT: CPT

## 2022-01-02 PROCEDURE — 6360000002 HC RX W HCPCS: Performed by: NURSE PRACTITIONER

## 2022-01-02 PROCEDURE — 94761 N-INVAS EAR/PLS OXIMETRY MLT: CPT

## 2022-01-02 PROCEDURE — 6370000000 HC RX 637 (ALT 250 FOR IP): Performed by: INTERNAL MEDICINE

## 2022-01-02 PROCEDURE — 6360000002 HC RX W HCPCS: Performed by: INTERNAL MEDICINE

## 2022-01-02 PROCEDURE — 94640 AIRWAY INHALATION TREATMENT: CPT

## 2022-01-02 PROCEDURE — 6370000000 HC RX 637 (ALT 250 FOR IP): Performed by: NURSE PRACTITIONER

## 2022-01-02 PROCEDURE — 1200000000 HC SEMI PRIVATE

## 2022-01-02 PROCEDURE — 85027 COMPLETE CBC AUTOMATED: CPT

## 2022-01-02 RX ORDER — ALBUTEROL SULFATE 90 UG/1
2 AEROSOL, METERED RESPIRATORY (INHALATION) 4 TIMES DAILY
Status: DISCONTINUED | OUTPATIENT
Start: 2022-01-02 | End: 2022-01-03 | Stop reason: HOSPADM

## 2022-01-02 RX ADMIN — SODIUM CHLORIDE, PRESERVATIVE FREE 10 ML: 5 INJECTION INTRAVENOUS at 10:32

## 2022-01-02 RX ADMIN — SODIUM CHLORIDE, PRESERVATIVE FREE 10 ML: 5 INJECTION INTRAVENOUS at 15:46

## 2022-01-02 RX ADMIN — Medication 10 ML: at 08:30

## 2022-01-02 RX ADMIN — ENOXAPARIN SODIUM 40 MG: 40 INJECTION SUBCUTANEOUS at 08:34

## 2022-01-02 RX ADMIN — Medication 2 PUFF: at 22:17

## 2022-01-02 RX ADMIN — KETOROLAC TROMETHAMINE 30 MG: 30 INJECTION, SOLUTION INTRAMUSCULAR; INTRAVENOUS at 10:32

## 2022-01-02 RX ADMIN — Medication 50 MG: at 08:30

## 2022-01-02 RX ADMIN — Medication 2 PUFF: at 16:20

## 2022-01-02 RX ADMIN — Medication 10 ML: at 19:52

## 2022-01-02 RX ADMIN — ENOXAPARIN SODIUM 120 MG: 120 INJECTION SUBCUTANEOUS at 19:51

## 2022-01-02 RX ADMIN — Medication 2000 UNITS: at 08:30

## 2022-01-02 RX ADMIN — ONDANSETRON 4 MG: 2 INJECTION INTRAMUSCULAR; INTRAVENOUS at 15:46

## 2022-01-02 RX ADMIN — DEXAMETHASONE SODIUM PHOSPHATE 6 MG: 10 INJECTION, SOLUTION INTRAMUSCULAR; INTRAVENOUS at 08:30

## 2022-01-02 RX ADMIN — Medication 2 PUFF: at 22:19

## 2022-01-02 RX ADMIN — KETOROLAC TROMETHAMINE 30 MG: 30 INJECTION, SOLUTION INTRAMUSCULAR; INTRAVENOUS at 19:52

## 2022-01-02 ASSESSMENT — PAIN DESCRIPTION - PROGRESSION: CLINICAL_PROGRESSION: GRADUALLY IMPROVING

## 2022-01-02 ASSESSMENT — PAIN SCALES - GENERAL
PAINLEVEL_OUTOF10: 0
PAINLEVEL_OUTOF10: 4
PAINLEVEL_OUTOF10: 3
PAINLEVEL_OUTOF10: 0
PAINLEVEL_OUTOF10: 3
PAINLEVEL_OUTOF10: 7
PAINLEVEL_OUTOF10: 7
PAINLEVEL_OUTOF10: 0

## 2022-01-02 ASSESSMENT — PAIN DESCRIPTION - DESCRIPTORS: DESCRIPTORS: ACHING

## 2022-01-02 ASSESSMENT — PAIN DESCRIPTION - FREQUENCY: FREQUENCY: INTERMITTENT

## 2022-01-02 ASSESSMENT — PAIN DESCRIPTION - LOCATION
LOCATION: GENERALIZED
LOCATION: HEAD;NECK

## 2022-01-02 ASSESSMENT — PAIN DESCRIPTION - PAIN TYPE
TYPE: ACUTE PAIN
TYPE: ACUTE PAIN

## 2022-01-02 NOTE — PROGRESS NOTES
[address] Regine Blake regarding pt complaint of head, neck and chest pain from cough  :   \"[phone] [facility] or Facility: [facility] From: Emilee Castelan RE: Elke Henderson 1990 RM: [id number] Pt here for COVID. On 3L nasal cannula. Pt complaining of neck pain, chest pain from cough, and headache that is described as \"my head feels like its about to explode. \" Pt received Toradol in ER yesterday and said that helped her. Please advise. Need Callback: NO CALLBACK REQ 5T ONCOLOGY ROUTINE\"    Awaiting response.

## 2022-01-02 NOTE — PROGRESS NOTES
[facility] PROGRESS NOTE    1/2/2022 12:24 PM        Name: Merritt Phoenix . Admitted: 12/31/2021  Primary Care Provider: No primary care provider on file. (Tel: None)      Chief Complaint   Patient presents with    Positive For Covid-19     pt states she has been covid + for 9 days. pt having n/v/d, cough, fever and o2 saturation of 92%. pt states she was seen at [facility] yeserday     Brief History: Patient is a 32 to female diagnosed with COVID 12/24. She has been seen in [facility]OS[facility] AT [facility] ER twice since her diagnosis but O2 sats above 90% and discharged home. She continues to note fever, cough, shortness of breath, n/v/d and presented to [facility]. CT pulmonary with diffuse bilateral ground glass opacities. In the ER, her O2 saturation had dropped to 86% on room air and she was started on Decadron. Subjective:  Up in bedside chair. Reports she is definitely starting to feel better. Still notes shortness of breath but improved, O2 needs stable on 2 liters, turned down to 1 liter while I was in room. Has had 1 BM today, reports stool is becoming more formed. Tolerating diet, denies nausea. Max temp 99.5 past 24 hours.      Reviewed interval ancillary notes    Current Medications  enoxaparin (LOVENOX) injection 40 mg, BID  albuterol sulfate  (90 Base) MCG/ACT inhaler 2 puff, 4x daily  ipratropium (ATROVENT HFA) 17 MCG/ACT inhaler 2 puff, 4x daily  ketorolac (TORADOL) injection 30 mg, Q6H PRN  sodium chloride flush 0.9 % injection 5-40 mL, 2 times per day  sodium chloride flush 0.9 % injection 5-40 mL, PRN  0.9 % sodium chloride infusion, PRN  ondansetron (ZOFRAN-ODT) disintegrating tablet 4 mg, Q8H PRN   Or  ondansetron (ZOFRAN) injection 4 mg, Q6H PRN  polyethylene glycol (GLYCOLAX) packet 17 g, Daily PRN  acetaminophen (TYLENOL) tablet 650 mg, Q6H PRN   Or  acetaminophen (TYLENOL) suppository 650 mg, Q6H PRN  guaiFENesin-dextromethorphan (ROBITUSSIN DM) 100-10 MG/5ML syrup 5 mL, Q4H PRN  dexamethasone (PF) (DECADRON) injection 6 mg, Daily  Vitamin D (CHOLECALCIFEROL) tablet 2,000 Units, Daily  zinc sulfate (ZINCATE) capsule 50 mg, Daily      Objective:  BP 97/67   Pulse 65   Temp 98.3 °F (36.8 °C) (Temporal)   Resp 18   Ht 5' 4\" (1.626 m)   Wt 252 lb 6.8 oz (114.5 kg)   LMP 12/28/2021   SpO2 96%   BMI 43.33 kg/m²   No intake or output data in the 24 hours ending 01/02/22 1224   Wt Readings from Last 3 Encounters:   01/01/22 252 lb 6.8 oz (114.5 kg)   08/14/19 240 lb (108.9 kg)   04/11/19 244 lb (110.7 kg)     General:  Awake, alert, oriented in NAD  Skin:  Warm and dry. No unusual bruising or rash  Neck:  Supple. No JVD or carotid bruit appreciated  Chest:  Normal effort. Diminished in bases, no wheezes/rhonchi/rales  Cardiovascular:  RRR, normal S1/S2, no murmur/gallop/rub  Abdomen:  Soft, nontender, +bowel sounds  Extremities:  No edema  Neurological: No focal deficits  Psychological: Normal mood and affect    Labs and Tests:  CBC:   Recent Labs     12/31/21  1753 01/01/22  0636 01/02/22  1017   WBC 5.1 4.3 5.1   HGB 13.8 14.0 12.7    189 251     BMP:    Recent Labs     12/31/21  1753 01/01/22  0636 01/02/22  1017    139 137   K 3.4* 4.4 3.8    105 105   CO2 22 22 19*   BUN 8 10 12   CREATININE 0.7 0.6 0.6   GLUCOSE 95 134* 170*     Hepatic:   Recent Labs     12/31/21  1753 01/01/22  0636   AST 44* 48*   ALT 20 26   BILITOT 0.4 0.4   ALKPHOS 49 52       CT Pulmonary 12/31/2021:  1. No evidence of pulmonary embolism   2. Diffuse ground-glass opacities present bilaterally, consistent with   COVID-19 pulmonary disease given the clinical history   3. Enlarged main pulmonary artery measuring 34 mm, suggesting pulmonary   arterial hypertension   4. Enlarged, heterogeneous left lobe of the thyroid gland.  Elective thyroid   ultrasound recommended for further evaluation   5.  Nonspecific

## 2022-01-02 NOTE — PROGRESS NOTES
Head to toe assessment complete. AM medication administered. Pt tolerated well. Pt denies pain. Pt denies further needs at this time. Call light in hand. Pt verbalizes correct use. Will continue to monitor.  Electronically signed by Yokasta Raya RN on 1/2/2022 at 9:02 AM

## 2022-01-02 NOTE — PLAN OF CARE
Problem: Diarrhea:  Goal: Establishment of normal bowel function will improve to within specified parameters  Description: Establishment of normal bowel function will improve to within specified parameters  Outcome: Ongoing     Problem: Nutrition  Goal: Optimal nutrition therapy  Outcome: Ongoing     Problem: Pain:  Goal: Pain level will decrease  Description: Pain level will decrease  Outcome: Ongoing  Goal: Control of acute pain  Description: Control of acute pain  Outcome: Ongoing  Goal: Control of chronic pain  Description: Control of chronic pain  Outcome: Ongoing

## 2022-01-03 VITALS
WEIGHT: 252.43 LBS | BODY MASS INDEX: 43.1 KG/M2 | HEART RATE: 48 BPM | OXYGEN SATURATION: 94 % | DIASTOLIC BLOOD PRESSURE: 91 MMHG | HEIGHT: 64 IN | TEMPERATURE: 99 F | RESPIRATION RATE: 16 BRPM | SYSTOLIC BLOOD PRESSURE: 151 MMHG

## 2022-01-03 LAB
ANION GAP SERPL CALCULATED.3IONS-SCNC: 12 MMOL/L (ref 3–16)
BUN BLDV-MCNC: 17 MG/DL (ref 7–20)
C-REACTIVE PROTEIN: 30.5 MG/L (ref 0–5.1)
CALCIUM SERPL-MCNC: 8.6 MG/DL (ref 8.3–10.6)
CHLORIDE BLD-SCNC: 106 MMOL/L (ref 99–110)
CO2: 23 MMOL/L (ref 21–32)
CREAT SERPL-MCNC: 0.6 MG/DL (ref 0.6–1.1)
D DIMER: 646 NG/ML DDU (ref 0–229)
GFR AFRICAN AMERICAN: >60
GFR NON-AFRICAN AMERICAN: >60
GLUCOSE BLD-MCNC: 134 MG/DL (ref 70–99)
POTASSIUM SERPL-SCNC: 4 MMOL/L (ref 3.5–5.1)
SODIUM BLD-SCNC: 141 MMOL/L (ref 136–145)

## 2022-01-03 PROCEDURE — 2580000003 HC RX 258: Performed by: INTERNAL MEDICINE

## 2022-01-03 PROCEDURE — 94761 N-INVAS EAR/PLS OXIMETRY MLT: CPT

## 2022-01-03 PROCEDURE — 6360000002 HC RX W HCPCS: Performed by: INTERNAL MEDICINE

## 2022-01-03 PROCEDURE — 6370000000 HC RX 637 (ALT 250 FOR IP): Performed by: INTERNAL MEDICINE

## 2022-01-03 PROCEDURE — 86140 C-REACTIVE PROTEIN: CPT

## 2022-01-03 PROCEDURE — 94640 AIRWAY INHALATION TREATMENT: CPT

## 2022-01-03 PROCEDURE — 80048 BASIC METABOLIC PNL TOTAL CA: CPT

## 2022-01-03 PROCEDURE — 94680 O2 UPTK RST&XERS DIR SIMPLE: CPT

## 2022-01-03 PROCEDURE — 6360000002 HC RX W HCPCS: Performed by: NURSE PRACTITIONER

## 2022-01-03 PROCEDURE — 6360000002 HC RX W HCPCS: Performed by: PHYSICIAN ASSISTANT

## 2022-01-03 PROCEDURE — 36415 COLL VENOUS BLD VENIPUNCTURE: CPT

## 2022-01-03 PROCEDURE — 85379 FIBRIN DEGRADATION QUANT: CPT

## 2022-01-03 PROCEDURE — 2700000000 HC OXYGEN THERAPY PER DAY

## 2022-01-03 RX ORDER — ONDANSETRON 4 MG/1
4 TABLET, ORALLY DISINTEGRATING ORAL EVERY 8 HOURS PRN
Qty: 9 TABLET | Refills: 0 | Status: SHIPPED | OUTPATIENT
Start: 2022-01-03 | End: 2022-01-06

## 2022-01-03 RX ORDER — DEXAMETHASONE 6 MG/1
6 TABLET ORAL
Qty: 7 TABLET | Refills: 0 | Status: SHIPPED | OUTPATIENT
Start: 2022-01-04 | End: 2022-01-14

## 2022-01-03 RX ORDER — ALBUTEROL SULFATE 90 UG/1
2 AEROSOL, METERED RESPIRATORY (INHALATION) 4 TIMES DAILY
Qty: 1 EACH | Refills: 0 | Status: SHIPPED | OUTPATIENT
Start: 2022-01-03

## 2022-01-03 RX ADMIN — ENOXAPARIN SODIUM 120 MG: 120 INJECTION SUBCUTANEOUS at 09:28

## 2022-01-03 RX ADMIN — Medication 2000 UNITS: at 09:28

## 2022-01-03 RX ADMIN — Medication 2 PUFF: at 08:47

## 2022-01-03 RX ADMIN — Medication 10 ML: at 09:28

## 2022-01-03 RX ADMIN — KETOROLAC TROMETHAMINE 30 MG: 30 INJECTION, SOLUTION INTRAMUSCULAR; INTRAVENOUS at 10:43

## 2022-01-03 RX ADMIN — Medication 50 MG: at 09:28

## 2022-01-03 RX ADMIN — Medication 2 PUFF: at 12:26

## 2022-01-03 RX ADMIN — Medication 2 PUFF: at 08:46

## 2022-01-03 RX ADMIN — DEXAMETHASONE SODIUM PHOSPHATE 6 MG: 10 INJECTION, SOLUTION INTRAMUSCULAR; INTRAVENOUS at 09:28

## 2022-01-03 ASSESSMENT — PAIN SCALES - GENERAL
PAINLEVEL_OUTOF10: 7
PAINLEVEL_OUTOF10: 0
PAINLEVEL_OUTOF10: 4

## 2022-01-03 ASSESSMENT — PAIN DESCRIPTION - PAIN TYPE: TYPE: ACUTE PAIN

## 2022-01-03 ASSESSMENT — PAIN DESCRIPTION - FREQUENCY: FREQUENCY: INTERMITTENT

## 2022-01-03 ASSESSMENT — PAIN DESCRIPTION - LOCATION: LOCATION: GENERALIZED

## 2022-01-03 ASSESSMENT — PAIN DESCRIPTION - DESCRIPTORS: DESCRIPTORS: ACHING

## 2022-01-03 NOTE — PROGRESS NOTES
CLINICAL PHARMACY NOTE: MEDS TO BEDS    Total # of Prescriptions Filled: 5   The following medications were delivered to the patient:  · Proair  · Eliquis 2.5 mg  · Dexamethasone 6 mg  · Zofran 4 mg  · Atrovent HFA 17 mcg    Additional Documentation:    Delivered to [facility] =signed  Camden Hernandez CPhT

## 2022-01-03 NOTE — DISCHARGE SUMMARY
[facility]    Patient Demographics    Leida Rodríguez  Date of Birth. 1990  MRN. [id number]     Primary care provider. No primary care provider on file. (Tel: None)    Admit date: 12/31/2021    Discharge date (blank if same as Note Date): Note Date: 1/3/2022     Reason for Hospitalization. Chief Complaint   Patient presents with    Positive For Covid-19     pt states she has been covid + for 9 days. pt having n/v/d, cough, fever and o2 saturation of 92%. pt states she was seen at [facility] yeserday         Significant Findings. Principal Problem:    Pneumonia due to COVID-19 virus  Active Problems:    Acute hypoxemic respiratory failure due to COVID-19 [facility])  Resolved Problems:    * No resolved hospital problems. *       Problems and results from this hospitalization that need follow up. 1. Recommend follow up CXR to ensure clearing. 2. Enlarged left lobe thyroid noted on CT scan. Recommend thyroid ultrasound for further evaluation. 3. Enlarged main pulmonary artery suggesting PAH. Recommend further evaluation. 4. Ketonuria / proteinuria. Recommend recheck UA. 5. Recommend recheck D-dimer on follow up. Significant test results and incidental findings. CT Pulmonary 12/31/2021:  1. No evidence of pulmonary embolism   2. Diffuse ground-glass opacities present bilaterally, consistent with   COVID-19 pulmonary disease given the clinical history   3. Enlarged main pulmonary artery measuring 34 mm, suggesting pulmonary   arterial hypertension   4. Enlarged, heterogeneous left lobe of the thyroid gland.  Elective thyroid   ultrasound recommended for further evaluation   5. Nonspecific mediastinal and hilar lymph nodes, likely reactive         Invasive procedures and treatments. 1. None     Problem-based Hospital Course.   Patient is a 32 to female diagnosed with COVID 12/24. She has been seen in [facility] ER twice since her diagnosis but O2 sats above 90% and discharged home. She continues to note fever, cough, shortness of breath, n/v/d and presented to [facility]. CT pulmonary with diffuse bilateral ground glass opacities. In the ER, her O2 saturation had dropped to 86% on room air and she was started on Decadron. 1. COVID pneumonia. PCR positive 12/24. CT pulmonary negative for PE but with diffuse bilateral ground glass opacities. On supplemental O2, sats stable at 2 liters. Started on IV dexamethasone on admission. Received 1 dose Actemra 1/1, ->68. Since symptom onset more than 7 days ago, not a candidate for remdesivir. D-dimer 887->925->1108->646, covered with full dose lovenox. Discharged on oral decadron to complete 10 day course. Also discharged on Eliquis as d-dimer > 2 x ULN on DC. She was assessed and qualified for home O2 on DC. .   2. Acute hypoxic respiratory failure. O2 sat in ER as low as 86% and started on supplemental O2, sats stable on 2 liters, high 90s. Treatment as above. O2 weaned at rest but continued to desaturate with ambulation. She was evaluated and qualified for O2 on DC. 3. Hypokalemia. Potassium 3.4 and replaced, stable. Likely secondary GI losses and poor po intake. Magnesium 2.20.  4. Diarrhea. GI pathogens and c diff negative. Most likely secondary to covid. Resolved on DC. 5. Enlargement of left lobe of thyroid. Incidental finding on CT scan. Recommend thyroid ultrasound as outpatient. 6. Ketonuria / proteinuria. UA with > 80 ketones. Most likely secondary to dehydration from n/v/d due to COVID infection. She was given 1 liter fluids in ER. Recommend recheck UA once recovers from [place]. Resting in bed. There were no acute overnight events, offers no new complaints. States she is feeling better and eager for DC home. Reviewed DC plans, follow up and medications. Expresses understanding. Questions answered.     Patient was evaluated today for the diagnosis of COVID pneumonia. I entered a DME order for home oxygen because the diagnosis and testing requires the patient to have supplemental oxygen. Condition will improve or be benefited by oxygen use. The patient is  able to perform good mobility in a home setting and therefore does require the use of a portable oxygen system. The need for this equipment was discussed with the patient and she understands and is in agreement. Consults. IP CONSULT TO HOSPITALIST    Physical examination on discharge day. BP (!) 136/92   Pulse 50   Temp 98.7 °F (37.1 °C) (Axillary)   Resp 18   Ht 5' 4\" (1.626 m)   Wt 252 lb 6.8 oz (114.5 kg)   LMP 12/28/2021   SpO2 93%   BMI 43.33 kg/m²   General appearance. Alert. Looks comfortable. HEENT. Sclera clear. Moist mucus membranes. Cardiovascular. Regular rate and rhythm, normal S1, S2. No murmur. Respiratory. Not using accessory muscles. Diminished in lower lobes, no wheeze. Gastrointestinal. Abdomen soft, non-tender, not distended, normal bowel sounds  Neurology. Facial symmetry. No speech deficits. Moving all extremities equally. Extremities. No edema in lower extremities. Skin. Warm, dry, normal turgor    Condition at time of discharge stable    Medication instructions provided to patient at discharge.      Medication List      START taking these medications    albuterol sulfate  (90 Base) MCG/ACT inhaler  Inhale 2 puffs into the lungs 4 times daily     apixaban 2.5 MG Tabs tablet  Commonly known as: Eliquis  Take 1 tablet by mouth 2 times daily     dexamethasone 6 MG tablet  Commonly known as: Decadron  Take 1 tablet by mouth daily (with breakfast) for 10 days     ipratropium 17 MCG/ACT inhaler  Commonly known as: ATROVENT HFA  Inhale 2 puffs into the lungs 4 times daily     ondansetron 4 MG disintegrating tablet  Commonly known as: ZOFRAN-ODT  Take 1 tablet by mouth every 8 hours as needed for Nausea or Vomiting        CONTINUE taking these medications    PRENATAL 1 PO        STOP taking these medications    dextroamphetamine 15 MG extended release capsule  Commonly known as: DEXEDRINE           Where to Get Your Medications      These medications were sent to [facility], [address], [address]    Phone: [phone]   · albuterol sulfate  (90 Base) MCG/ACT inhaler  · apixaban 2.5 MG Tabs tablet  · dexamethasone 6 MG tablet  · ipratropium 17 MCG/ACT inhaler  · ondansetron 4 MG disintegrating tablet         Discharge recommendations given to patient. Follow Up. PCP in 1 week   Disposition. home  Activity. activity as tolerated  Diet: ADULT DIET; Regular  ADULT ORAL NUTRITION SUPPLEMENT; Lunch, Dinner; Clear Liquid Oral Supplement      Spent > 30 minutes in discharge process.     Signed:  DENNYS Villegas CNP     1/3/2022 8:32 AM

## 2022-01-03 NOTE — PROGRESS NOTES
Head to toe assessment complete. Pt resting in bed at this time. AM medication administered. Pt tolerated well. Pt denies pain. Pt denies further needs at this time. Call light in hand. Pt verbalizes correct use. Will continue to monitor.  Electronically signed by Ravi Waldrop RN on 1/3/2022 at 9:34 AM

## 2022-01-03 NOTE — PROGRESS NOTES
01/03/22 0851   Resting (Room Air)   SpO2 90   HR 62   Resting (On O2)   SpO2 93   HR 53   O2 Device Nasal cannula   O2 Flow Rate (l/min) 1 l/min   During Walk (Room Air)   SpO2 85   HR 93   Rate of Dyspnea 2   During Walk (On O2)   SpO2 91      O2 Device Nasal cannula   O2 Flow Rate (l/min) 6 l/min   Walk/Assistance Device Ambulation   Rate of Dyspnea 2   After Walk   SpO2 94   HR 73   O2 Device Nasal cannula   Does the Patient Qualify for Home O2 Yes

## 2022-01-03 NOTE — CARE COORDINATION
Dm received a referral to this patient for home 02. Home 02 referral has been sent to [address] [phone].   Electronically signed by Genevieve Baez on 1/3/2022 at 1:40 PM

## 2022-01-03 NOTE — DISCHARGE SUMMARY
Luiz Paula RCP   Respiratory Therapist   Specialty:  Respiratory Therapy   Progress Notes      Signed   Date of Service:  1/3/2022  9:08 AM                 Signed                Show:Clear all  []Manual[x]Template[]Copied    Added by:  [x]Tino Espino RCP      []Gonsalo for details         01/03/22 0851   Resting (Room Air)   SpO2 90   HR 62   Resting (On O2)   SpO2 93   HR 53   O2 Device Nasal cannula   O2 Flow Rate (l/min) 1 l/min   During Walk (Room Air)   SpO2 85   HR 93   Rate of Dyspnea 2   During Walk (On O2)   SpO2 91      O2 Device Nasal cannula   O2 Flow Rate (l/min) 6 l/min   Walk/Assistance Device Ambulation   Rate of Dyspnea 2   After Walk   SpO2 94   HR 73   O2 Device Nasal cannula   Does the Patient Qualify for Home O2 Yes

## 2022-01-03 NOTE — PROGRESS NOTES
Discharge instructions handed to and discussed with pt. New prescriptions filled by outpt pharmacy, handed to, and discussed with pt. Pt verbalized understanding. All questions answered. Pt denies vaccination needs. Pt denies any further needs. Pt taken to discharge via wheelchair by Deisy Barajas.  Electronically signed by Adrianna Jimenez RN on 1/3/2022 at 3:46 PM

## 2022-01-03 NOTE — CARE COORDINATION
Madeleine Palacios with [phone] aware of home oxygen need and is working on it. CM provided to [place]. [address] [facility] Final PCP list and [facility] pamphlet. Patient discharged 1/3/2022  to home with oxygen.    All discharge needs met per case management       Nataliya Jensen RN, BSN  [phone]

## 2022-01-04 ENCOUNTER — CARE COORDINATION (OUTPATIENT)
Dept: CASE MANAGEMENT | Age: 32
End: 2022-01-04

## 2022-01-04 NOTE — CARE COORDINATION
[facility] Transitions Initial Follow Up Call    Call within 2 business days of discharge: Yes    Patient: Elke Henderson Patient : 1990   MRN: [id number]  Reason for Admission:   Discharge Date: 1/3/22 RARS: Readmission Risk Score: 7.4 ( )      Last Discharge [facility]       Complaint Diagnosis Description Type Department Provider    21 Positive For Covid-19 Acute respiratory failure with hypoxia ([address] . .. ED to Hosp-Admission (Discharged) (ADMITTED) [facility] 5T Tressa Roger MD; Arik Lopez . .. Non-face-to-face services provided:  Obtained and reviewed discharge summary and/or continuity of care documents     Transitions of Care Initial Call    Was this an external facility discharge? No Discharge Facility:     Challenges to be reviewed by the provider   Additional needs identified to be addressed with provider: No  none             Method of communication with provider : none      Advance Care Planning:   Does patient have an Advance Directive: not on file; education provided. Was this a readmission? No  Patient stated reason for admission: COVID+  Patients top risk factors for readmission: medical condition-COVID    Care Transition Nurse (CTN) contacted the patient by telephone to perform post hospital discharge assessment. Verified name and  with patient as identifiers. Provided introduction to self, and explanation of the CTN role. CTN reviewed discharge instructions, medical action plan and red flags with patient who verbalized understanding. Patient given an opportunity to ask questions and does not have any further questions or concerns at this time. Were discharge instructions available to patient? Yes. Reviewed appropriate site of care based on symptoms and resources available to patient including: When to call 911. The patient agrees to contact the PCP office for questions related to their healthcare.      Medication reconciliation was performed with patient, who verbalizes understanding of administration of home medications. Advised obtaining a 90-day supply of all daily and as-needed medications. Covid Risk Education     Educated patient about risk for severe COVID-19 due to risk factors according to CDC guidelines. CTN reviewed discharge instructions, medical action plan and red flag symptoms with the patient who verbalized understanding. Discussed COVID vaccination status: Yes. Education provided on COVID-19 vaccination as appropriate. Discussed exposure protocols and quarantine with CDC Guidelines. Patient was given an opportunity to verbalize any questions and concerns and agrees to contact CTN or health care provider for questions related to their healthcare. Reviewed and educated patient on any new and changed medications related to discharge diagnosis. Was patient discharged with a pulse oximeter? No, but has one. Discussed and confirmed pulse oximeter discharge instructions and when to notify provider or seek emergency care. Pt states doing pretty well,denies SOB, CP, cough, fever. Using oxygen at 2L per [place]. O2 sats are in the 90s, currently 92%. Pt states she only received 7 decadron pills, D/C paperwork states take for 10 days. This nurse informed pt that a call will be made by this nurse to the hospitalist who d/c'd her for clarification. Agreed to more CTC f/u calls. This nurse perfect served RASHIDA Martínez, who stated that the 7 doses were correct, she received 3 doses in the hospital. This nurse updated patient, she was very appreciative. CTN provided contact information. Plan for follow-up call in 5-7 days based on severity of symptoms and risk factors.   Plan for next call: self management-COVID, O2, f/u appt    Care Transitions 24 Hour Call    Do you have any ongoing symptoms?: No  Do you have a copy of your discharge instructions?: Yes  Do you have all of your prescriptions and are they filled?: Yes  Have you been contacted by a [address]?: No  Have you scheduled your follow up appointment?: No  Were you discharged with any Home Care or Post Acute Services: No  Do you feel like you have everything you need to keep you well at home?: Yes  Care Transitions Interventions  No Identified Needs         Follow Up  No future appointments.     Lakhwinder Jordan RN

## 2022-01-12 ENCOUNTER — CARE COORDINATION (OUTPATIENT)
Dept: CASE MANAGEMENT | Age: 32
End: 2022-01-12

## 2022-01-19 ENCOUNTER — CARE COORDINATION (OUTPATIENT)
Dept: CASE MANAGEMENT | Age: 32
End: 2022-01-19

## 2022-01-19 NOTE — CARE COORDINATION
Care Transitions Outreach Attempt    Call within 2 business days of discharge: Yes   Attempted to reach patient for transitions of care follow up. Unable to reach patient. Left HIPPA Compliant VM. Mariano Lewis  [facility], [facility]: [phone]    Patient: Cassy Baca Patient : 1990 MRN: <[id number]>    Last Discharge [facility]       Complaint Diagnosis Description Type Department Provider    21 Positive For Covid-19 Acute respiratory failure with hypoxia ([address] . .. ED to Hosp-Admission (Discharged) (ADMITTED) F 5T Hermila Ortiz MD; Sandip Gordon . .. Was this an external facility discharge? No Discharge Facility: [facility]    Noted following upcoming appointments from discharge chart review:   [facility] follow up appointment(s): No future appointments.   Non-[facility] follow up appointment(s):

## 2022-01-26 ENCOUNTER — CARE COORDINATION (OUTPATIENT)
Dept: CASE MANAGEMENT | Age: 32
End: 2022-01-26

## 2022-01-26 NOTE — CARE COORDINATION
[facility] Transitions Follow Up Call    2022    Patient: Ryan Valencia  Patient : 1990   MRN: [id number]  Reason for Admission: COVID19     Attempted to reach pt for follow up call. Left message requesting call back. Care Transitions Subsequent and Final Call    Subsequent and Final Calls  Care Transitions Interventions  Other Interventions: Follow Up  No future appointments.     [place] [employer]